# Patient Record
Sex: FEMALE | Race: OTHER | HISPANIC OR LATINO | URBAN - METROPOLITAN AREA
[De-identification: names, ages, dates, MRNs, and addresses within clinical notes are randomized per-mention and may not be internally consistent; named-entity substitution may affect disease eponyms.]

---

## 2018-07-03 ENCOUNTER — OUTPATIENT (OUTPATIENT)
Dept: OUTPATIENT SERVICES | Facility: HOSPITAL | Age: 68
LOS: 1 days | Discharge: HOME | End: 2018-07-03

## 2018-07-03 DIAGNOSIS — E78.00 PURE HYPERCHOLESTEROLEMIA, UNSPECIFIED: ICD-10-CM

## 2018-07-03 DIAGNOSIS — E11.9 TYPE 2 DIABETES MELLITUS WITHOUT COMPLICATIONS: ICD-10-CM

## 2018-07-03 DIAGNOSIS — E03.9 HYPOTHYROIDISM, UNSPECIFIED: ICD-10-CM

## 2018-08-07 PROBLEM — Z00.00 ENCOUNTER FOR PREVENTIVE HEALTH EXAMINATION: Status: ACTIVE | Noted: 2018-08-07

## 2018-08-20 ENCOUNTER — APPOINTMENT (OUTPATIENT)
Dept: VASCULAR SURGERY | Facility: CLINIC | Age: 68
End: 2018-08-20

## 2018-10-01 ENCOUNTER — APPOINTMENT (OUTPATIENT)
Dept: VASCULAR SURGERY | Facility: CLINIC | Age: 68
End: 2018-10-01

## 2019-05-02 ENCOUNTER — OUTPATIENT (OUTPATIENT)
Dept: OUTPATIENT SERVICES | Facility: HOSPITAL | Age: 69
LOS: 1 days | Discharge: HOME | End: 2019-05-02
Payer: MEDICARE

## 2019-05-02 DIAGNOSIS — M79.641 PAIN IN RIGHT HAND: ICD-10-CM

## 2019-05-02 DIAGNOSIS — M79.642 PAIN IN LEFT HAND: ICD-10-CM

## 2019-05-02 PROCEDURE — 73130 X-RAY EXAM OF HAND: CPT | Mod: 26,50

## 2020-11-19 ENCOUNTER — OUTPATIENT (OUTPATIENT)
Dept: OUTPATIENT SERVICES | Facility: HOSPITAL | Age: 70
LOS: 1 days | Discharge: HOME | End: 2020-11-19

## 2020-11-19 ENCOUNTER — APPOINTMENT (OUTPATIENT)
Dept: GERIATRICS | Facility: CLINIC | Age: 70
End: 2020-11-19
Payer: MEDICARE

## 2020-11-19 PROCEDURE — 99203 OFFICE O/P NEW LOW 30 MIN: CPT

## 2020-11-19 NOTE — END OF VISIT
[] : Resident [FreeTextEntry3] : SEEN WITH GERIATRICS TEAM\par Here to establish care\dakota is a diabetic - ordering lbs, optho and podiatry evaluations\dakota Feels unsteady in her gait and wants to go to PT which she has found helpful in past - will refer\par Note that she does not want immunizations, mammo or colonoscopy - will start with above issues and advised her that at  telehealth f/u in January we will readdress doing some of these issues related to health care maintenance.

## 2020-11-19 NOTE — ASSESSMENT
[FreeTextEntry1] : Pt is a 69 yo F with PMHx of diabetes, HTN, HLD, athritis (b/l hands), sciatica, vertigo, L hearing loss/tinnitus, and depression presenting for initial evaluation to establish care.\par \par #diabetes\par -c/w metformin 750mg BID\par -obtain A1c\par -podiatry and ophtho referral\par \par #PVD\par #ankle swelling\par -f/u podiatry referral\par -PT referral\par \par #HTN\par #HLD\par -c/w enalapril 5mg, atorvastatin 10mg qD\par \par #arthritis\par Arthritis in b/l hands. 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 Sees Dr. Tamela Hernandez\par -f/u rheumatology\par \par #sciatica\par -PT referral\par \par #vertigo\par #L hearing loss/tinnitus\par Vertigo has improved with time. Stopped taking medication. \par -Follows with ENT Dr. Wells for L hearing loss and tinnitus\par \par #depression\par -c/w venlafaxine 50mg \par \par #non-compliance with HCM\par -flu shot offered\par -needs colonoscopy\par -last mammogram 20 years ago per patient\par -last pap smear >5 years ago\par -last DEXA scan 5-6 months ago\par -obtain HbA1c, lipid panel, TSH, CBC, CMP\par \par #RTC in 2 months for telephonic visit with repeat labs

## 2020-11-19 NOTE — HISTORY OF PRESENT ILLNESS
[FreeTextEntry1] : Pt is a 71 yo F with PMHx of diabetes, HTN, HLD, athritis (b/l hands), sciatica, vertigo, L hearing loss/tinnitus, and depression presenting for initial evaluation to establish care. Patient was seeing Dr. Masterson in [place] but he recently retired and would like to establish new care. Pt needs to send old records here. Today, complains of some blurry vision for which she sees ophthalmologist, last seen in January 2020. Notes trying to eat more fruits and vegetables, but has not been very successful. Notes weight gain since March. Endorses pain in her b/l ankles, and rash and swelling that contributes, in addition to sciatica, to her need to use a cane for ambulation. She had gone to physical therapy in the past which has helped and has weight loss, and she would like to continue this. Also followed with rheumatologist for arthritis in b/l hands, recent hand imaging showed b/l DIP and PIP joint degeneration. \par \par

## 2020-11-19 NOTE — PHYSICAL EXAM
[General Appearance - Alert] : alert [General Appearance - In No Acute Distress] : in no acute distress [General Appearance - Well Nourished] : well nourished [Sclera] : the sclera and conjunctiva were normal [PERRL With Normal Accommodation] : pupils were equal in size, round, and reactive to light [Extraocular Movements] : extraocular movements were intact [Neck Appearance] : the appearance of the neck was normal [] : no respiratory distress [Exaggerated Use Of Accessory Muscles For Inspiration] : no accessory muscle use [Auscultation Breath Sounds / Voice Sounds] : lungs were clear to auscultation bilaterally [Heart Rate And Rhythm] : heart rate was normal and rhythm regular [Heart Sounds] : normal S1 and S2 [Murmurs] : no murmurs [Bowel Sounds] : normal bowel sounds [Abdomen Soft] : soft [Abdomen Tenderness] : non-tender [Cervical Lymph Nodes Enlarged Anterior Bilaterally] : anterior cervical [No CVA Tenderness] : no ~M costovertebral angle tenderness [Cranial Nerves] : cranial nerves 2-12 were intact [Sensation] : the sensory exam was normal to light touch and pinprick [No Focal Deficits] : no focal deficits [Oriented To Time, Place, And Person] : oriented to person, place, and time [Affect] : the affect was normal [FreeTextEntry1] : Swelling and induration b/l ankles

## 2020-11-20 DIAGNOSIS — M54.30 SCIATICA, UNSPECIFIED SIDE: ICD-10-CM

## 2020-11-20 DIAGNOSIS — E11.9 TYPE 2 DIABETES MELLITUS WITHOUT COMPLICATIONS: ICD-10-CM

## 2020-11-20 DIAGNOSIS — I10 ESSENTIAL (PRIMARY) HYPERTENSION: ICD-10-CM

## 2020-11-20 DIAGNOSIS — E78.00 PURE HYPERCHOLESTEROLEMIA, UNSPECIFIED: ICD-10-CM

## 2020-11-20 DIAGNOSIS — H81.4 VERTIGO OF CENTRAL ORIGIN: ICD-10-CM

## 2020-12-02 ENCOUNTER — APPOINTMENT (OUTPATIENT)
Dept: NEUROLOGY | Facility: CLINIC | Age: 70
End: 2020-12-02

## 2020-12-07 ENCOUNTER — OUTPATIENT (OUTPATIENT)
Dept: OUTPATIENT SERVICES | Facility: HOSPITAL | Age: 70
LOS: 1 days | Discharge: HOME | End: 2020-12-07
Payer: MEDICARE

## 2020-12-07 ENCOUNTER — APPOINTMENT (OUTPATIENT)
Dept: OPHTHALMOLOGY | Facility: CLINIC | Age: 70
End: 2020-12-07

## 2020-12-07 PROCEDURE — 92134 CPTRZ OPH DX IMG PST SGM RTA: CPT | Mod: 26

## 2020-12-07 PROCEDURE — 92002 INTRM OPH EXAM NEW PATIENT: CPT

## 2020-12-10 DIAGNOSIS — H11.823 CONJUNCTIVOCHALASIS, BILATERAL: ICD-10-CM

## 2020-12-10 DIAGNOSIS — H25.13 AGE-RELATED NUCLEAR CATARACT, BILATERAL: ICD-10-CM

## 2020-12-10 DIAGNOSIS — H50.52 EXOPHORIA: ICD-10-CM

## 2020-12-10 DIAGNOSIS — H43.821 VITREOMACULAR ADHESION, RIGHT EYE: ICD-10-CM

## 2021-02-17 ENCOUNTER — RX RENEWAL (OUTPATIENT)
Age: 71
End: 2021-02-17

## 2021-02-25 ENCOUNTER — OUTPATIENT (OUTPATIENT)
Dept: OUTPATIENT SERVICES | Facility: HOSPITAL | Age: 71
LOS: 1 days | Discharge: HOME | End: 2021-02-25

## 2021-02-25 ENCOUNTER — APPOINTMENT (OUTPATIENT)
Dept: GERIATRICS | Facility: CLINIC | Age: 71
End: 2021-02-25

## 2021-03-09 ENCOUNTER — OUTPATIENT (OUTPATIENT)
Dept: OUTPATIENT SERVICES | Facility: HOSPITAL | Age: 71
LOS: 1 days | Discharge: HOME | End: 2021-03-09
Payer: MEDICARE

## 2021-03-09 ENCOUNTER — APPOINTMENT (OUTPATIENT)
Dept: OPHTHALMOLOGY | Facility: CLINIC | Age: 71
End: 2021-03-09

## 2021-03-09 PROCEDURE — 92134 CPTRZ OPH DX IMG PST SGM RTA: CPT | Mod: 26

## 2021-03-09 PROCEDURE — 92012 INTRM OPH EXAM EST PATIENT: CPT

## 2021-03-10 DIAGNOSIS — H25.13 AGE-RELATED NUCLEAR CATARACT, BILATERAL: ICD-10-CM

## 2021-03-10 DIAGNOSIS — H16.423 PANNUS (CORNEAL), BILATERAL: ICD-10-CM

## 2021-03-10 DIAGNOSIS — E11.9 TYPE 2 DIABETES MELLITUS WITHOUT COMPLICATIONS: ICD-10-CM

## 2021-03-10 DIAGNOSIS — H35.039 HYPERTENSIVE RETINOPATHY, UNSPECIFIED EYE: ICD-10-CM

## 2021-03-10 DIAGNOSIS — H35.373 PUCKERING OF MACULA, BILATERAL: ICD-10-CM

## 2021-03-15 RX ORDER — VENLAFAXINE 50 MG/1
50 TABLET ORAL
Qty: 180 | Refills: 3 | Status: ACTIVE | COMMUNITY
Start: 2020-11-19 | End: 1900-01-01

## 2021-04-08 ENCOUNTER — APPOINTMENT (OUTPATIENT)
Dept: GERIATRICS | Facility: CLINIC | Age: 71
End: 2021-04-08
Payer: MEDICARE

## 2021-04-08 ENCOUNTER — OUTPATIENT (OUTPATIENT)
Dept: OUTPATIENT SERVICES | Facility: HOSPITAL | Age: 71
LOS: 1 days | Discharge: HOME | End: 2021-04-08

## 2021-04-08 VITALS
WEIGHT: 200 LBS | TEMPERATURE: 97.7 F | DIASTOLIC BLOOD PRESSURE: 78 MMHG | SYSTOLIC BLOOD PRESSURE: 130 MMHG | HEART RATE: 85 BPM | OXYGEN SATURATION: 97 %

## 2021-04-08 VITALS — BODY MASS INDEX: 39.06 KG/M2 | HEIGHT: 60 IN

## 2021-04-08 DIAGNOSIS — M54.9 DORSALGIA, UNSPECIFIED: ICD-10-CM

## 2021-04-08 DIAGNOSIS — G89.29 DORSALGIA, UNSPECIFIED: ICD-10-CM

## 2021-04-08 PROCEDURE — 99214 OFFICE O/P EST MOD 30 MIN: CPT

## 2021-04-08 NOTE — HISTORY OF PRESENT ILLNESS
[FreeTextEntry1] : Pt is a 70 yo F with PMHx of diabetes, HTN, HLD, athritis (b/l hands), sciatica, vertigo, L hearing loss/tinnitus, and depression presenting for f/u, last seen11/2020. \par \par Endorses pain in her b/l ankles, and rash and swelling that contributes, in addition to sciatica, to her need to use a cane for ambulation. \par \par Last saw ophthalmologist in 3/2021\par \par Has not f/u with PT.\par \par Also followed with rheumatologist Dr. Hernandez for arthritis in b/l hands, recent hand imaging showed b/l DIP and PIP joint degeneration. \par \par

## 2021-04-08 NOTE — END OF VISIT
[] : A student assisted with documenting this visit. I have reviewed and verified all information documented by the student, and made modifications to such information, when appropriate. [FreeTextEntry3] : Seen WITH JAGRUTI TEAM; patient describes a pain she calls "sciatica" but is actually LBP radiating bilt (greater on left) with numbness and vague discomfort, not overt pain); also when asked she admits she uses shopping cart all the time because leaning on it makes her feel better; so will do MRI L?S spine and refer for PT for likely lumbar stenosis

## 2021-04-08 NOTE — ASSESSMENT
[FreeTextEntry1] : Pt is a 72 yo F with PMHx of diabetes, HTN, HLD, athritis (b/l hands), sciatica, vertigo, L hearing loss/tinnitus, and depression presenting for initial evaluation to establish care.\par \par #diabetes\par -A1C 8%\par -pt was out of medication until 3/8\par -glipizide 5mg\par -keep metformin at  750mg BID\par -podiatry and ophtho referral\par \par #?PVD\par -cool extremities, bilateral lower limb \par -DANI next visit \par \par #HTN\par #HLD\par -/78 in office\par -LDL 69, total 186\par -c/w enalapril 5mg, atorvastatin 10mg qD\par \par #arthritis\par Arthritis in b/l hands. 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 Sees Dr. Tamela Hernandez\par -f/u rheumatology\par \par #sciatica?Lumbar spinal stenosis\par -Pt describes pain as wrapping around to anterior legs, improves with sitting, and improves when leaning on shopping cart.\par -MRI lumbar spine to r/o lumbar stenosis. Pt extremely claustrophobic, will provide Valium to pre medicate\par -PT referral \par \par #vertigo\par #L hearing loss/tinnitus\par Vertigo has improved with time. Stopped taking medication. \par -Follows with ENT Dr. Wells for L hearing loss and tinnitus\par -Dr. Wells has recommended MRI, pt is claustrophobic and hasn't done it yet, encouraged pt to speak with Dr. Wells\par \par #depression\par -pt has been taking med as prn, pt education about daily use. \par -c/w venlafaxine 50mg \par \par #HCM\par -PT, Podiatry referral \par -needs colonoscopy ?\par -mammogram referral\par -last pap smear >5 years ago\par -DEXA scan recent\par \par #RTC 3 mos

## 2021-04-13 DIAGNOSIS — M54.9 DORSALGIA, UNSPECIFIED: ICD-10-CM

## 2021-04-15 ENCOUNTER — OUTPATIENT (OUTPATIENT)
Dept: OUTPATIENT SERVICES | Facility: HOSPITAL | Age: 71
LOS: 1 days | Discharge: HOME | End: 2021-04-15

## 2021-04-16 DIAGNOSIS — N95.9 UNSPECIFIED MENOPAUSAL AND PERIMENOPAUSAL DISORDER: ICD-10-CM

## 2021-04-16 DIAGNOSIS — Z13.820 ENCOUNTER FOR SCREENING FOR OSTEOPOROSIS: ICD-10-CM

## 2021-05-07 ENCOUNTER — OUTPATIENT (OUTPATIENT)
Dept: OUTPATIENT SERVICES | Facility: HOSPITAL | Age: 71
LOS: 1 days | Discharge: HOME | End: 2021-05-07
Payer: MEDICARE

## 2021-05-07 DIAGNOSIS — M25.512 PAIN IN LEFT SHOULDER: ICD-10-CM

## 2021-05-07 PROCEDURE — 73030 X-RAY EXAM OF SHOULDER: CPT | Mod: 26,RT

## 2021-05-21 RX ORDER — METFORMIN ER 750 MG 750 MG/1
750 TABLET ORAL DAILY
Qty: 180 | Refills: 3 | Status: ACTIVE | COMMUNITY
Start: 2020-11-19 | End: 1900-01-01

## 2021-07-06 ENCOUNTER — OUTPATIENT (OUTPATIENT)
Dept: OUTPATIENT SERVICES | Facility: HOSPITAL | Age: 71
LOS: 1 days | Discharge: HOME | End: 2021-07-06

## 2021-07-06 DIAGNOSIS — M54.9 DORSALGIA, UNSPECIFIED: ICD-10-CM

## 2021-10-28 DIAGNOSIS — E10.29 TYPE 1 DIABETES MELLITUS WITH OTHER DIABETIC KIDNEY COMPLICATION: ICD-10-CM

## 2021-11-04 ENCOUNTER — RX RENEWAL (OUTPATIENT)
Age: 71
End: 2021-11-04

## 2021-11-04 RX ORDER — GLIPIZIDE 5 MG/1
5 TABLET ORAL
Qty: 90 | Refills: 3 | Status: ACTIVE | COMMUNITY
Start: 2020-11-19 | End: 1900-01-01

## 2021-11-18 ENCOUNTER — APPOINTMENT (OUTPATIENT)
Dept: GERIATRICS | Facility: CLINIC | Age: 71
End: 2021-11-18

## 2021-12-08 ENCOUNTER — APPOINTMENT (OUTPATIENT)
Dept: OPHTHALMOLOGY | Facility: CLINIC | Age: 71
End: 2021-12-08

## 2022-03-03 ENCOUNTER — RX RENEWAL (OUTPATIENT)
Age: 72
End: 2022-03-03

## 2022-03-03 RX ORDER — ENALAPRIL MALEATE 5 MG/1
5 TABLET ORAL TWICE DAILY
Qty: 180 | Refills: 3 | Status: ACTIVE | COMMUNITY
Start: 2020-11-19 | End: 1900-01-01

## 2022-05-03 ENCOUNTER — RX RENEWAL (OUTPATIENT)
Age: 72
End: 2022-05-03

## 2022-05-03 RX ORDER — ATORVASTATIN CALCIUM 10 MG/1
10 TABLET, FILM COATED ORAL DAILY
Qty: 90 | Refills: 3 | Status: ACTIVE | COMMUNITY
Start: 2020-11-19 | End: 1900-01-01